# Patient Record
Sex: MALE | Race: WHITE | NOT HISPANIC OR LATINO | URBAN - METROPOLITAN AREA
[De-identification: names, ages, dates, MRNs, and addresses within clinical notes are randomized per-mention and may not be internally consistent; named-entity substitution may affect disease eponyms.]

---

## 2024-01-22 ENCOUNTER — OFFICE VISIT (OUTPATIENT)
Dept: INTERNAL MEDICINE | Facility: CLINIC | Age: 29
End: 2024-01-22
Payer: COMMERCIAL

## 2024-01-22 VITALS
SYSTOLIC BLOOD PRESSURE: 130 MMHG | HEART RATE: 87 BPM | DIASTOLIC BLOOD PRESSURE: 64 MMHG | TEMPERATURE: 99.5 F | OXYGEN SATURATION: 98 %

## 2024-01-22 DIAGNOSIS — J01.01 ACUTE RECURRENT MAXILLARY SINUSITIS: Primary | ICD-10-CM

## 2024-01-22 DIAGNOSIS — H69.93 EUSTACHIAN TUBE DYSFUNCTION, BILATERAL: ICD-10-CM

## 2024-01-22 DIAGNOSIS — R05.2 SUBACUTE COUGH: ICD-10-CM

## 2024-01-22 PROCEDURE — 99213 OFFICE O/P EST LOW 20 MIN: CPT | Performed by: NURSE PRACTITIONER

## 2024-01-22 RX ORDER — ALBUTEROL SULFATE 90 UG/1
2 AEROSOL, METERED RESPIRATORY (INHALATION) EVERY 4 HOURS PRN
Qty: 8 G | Refills: 0 | Status: SHIPPED | OUTPATIENT
Start: 2024-01-22

## 2024-01-22 RX ORDER — LEVOFLOXACIN 500 MG/1
500 TABLET, FILM COATED ORAL DAILY
Qty: 7 TABLET | Refills: 0 | Status: SHIPPED | OUTPATIENT
Start: 2024-01-22 | End: 2024-01-29

## 2024-01-22 NOTE — PROGRESS NOTES
"Chief Complaint  Cough (X 1 month), congestion (Chest x 1 month), and Earache (Taking mucinex, theraflu, stopped afrin nose spray, restarted since can not breathe out went to [facility] on 12/23 was given abx and steroid. Pt states that abx and steroids didn't help. )    Subjective        Jamie Naidu presents to [facility]  History of Present Illness  Here with complaint 1 month history of cough, chest congestion, ear pain    He was seen at [facility] urgent care on December 23, 2023 with same symptoms.  Diagnosed with viral upper respiratory infection, and he was prescribed prednisone 50 mg tapering dose as well as promethazine-DM cough syrup.  He tested negative for SARS COVID. He went to his previous PCP in [place], [place] on 12/30/2023 and was prescribed Augmentin BID x 5 days. No improvement. He is having a dry cough with intermittent production, sinus congestion and ear pain. He is taking OTC Mucinex, Sinex nose spray, Afrin, TheraFlu. He used to take fluticasone, Neti pot in the AM.     He has no history of asthma, non-smoker. No history of allergies. No other remarkable medical history. He is a  and has been grounded until sinus pain/pressure has improved.       Objective   Vital Signs:  /64 (BP Location: Left arm, Patient Position: Sitting, Cuff Size: Adult)   Pulse 87   Temp 99.5 °F (37.5 °C) (Infrared)   SpO2 98%   Estimated body mass index is 29.57 kg/m² as calculated from the following:    Height as of 12/23/23: 182.9 cm (72\").    Weight as of 12/23/23: 98.9 kg (218 lb).       BMI is >= 25 and <30. (Overweight) The following options were offered after discussion;: not discussed at this office visit.       Physical Exam  Vitals reviewed.   Constitutional:       General: He is not in acute distress.     Appearance: Normal appearance. He is normal weight. He is not ill-appearing or toxic-appearing.   HENT:      Head: Normocephalic.      Right Ear: Hearing, ear " canal and external ear normal. A middle ear effusion is present. There is no impacted cerumen. No mastoid tenderness. Tympanic membrane is retracted (mild, not as retracted as left TM, dull). Tympanic membrane is not perforated or erythematous.      Left Ear: Hearing, ear canal and external ear normal. A middle ear effusion is present. There is no impacted cerumen. No mastoid tenderness. Tympanic membrane is retracted. Tympanic membrane is not perforated or erythematous.      Nose: Congestion and rhinorrhea present.      Mouth/Throat:      Mouth: Mucous membranes are dry.      Pharynx: Oropharynx is clear.   Eyes:      General:         Right eye: No discharge.         Left eye: No discharge.      Conjunctiva/sclera: Conjunctivae normal.      Pupils: Pupils are equal, round, and reactive to light.   Cardiovascular:      Rate and Rhythm: Normal rate and regular rhythm.      Pulses: Normal pulses.      Heart sounds: Normal heart sounds.   Pulmonary:      Effort: Pulmonary effort is normal.      Breath sounds: Normal breath sounds.   Lymphadenopathy:      Head:      Right side of head: No submental, submandibular, tonsillar, preauricular, posterior auricular or occipital adenopathy.      Left side of head: No submental, submandibular, tonsillar, preauricular, posterior auricular or occipital adenopathy.   Skin:     General: Skin is warm.      Capillary Refill: Capillary refill takes less than 2 seconds.   Neurological:      General: No focal deficit present.      Mental Status: He is alert and oriented to person, place, and time. Mental status is at baseline.   Psychiatric:         Mood and Affect: Mood normal.         Behavior: Behavior normal.         Thought Content: Thought content normal.         Judgment: Judgment normal.        Result Review :                   Assessment and Plan   Patient is a very pleasant 28-year-old male with no remarkable medical history here for at least 1 month history of  cough.    Diagnoses and all orders for this visit:    1. Acute recurrent maxillary sinusitis (Primary)  -     levoFLOXacin (Levaquin) 500 MG tablet; Take 1 tablet by mouth Daily for 7 days.  Dispense: 7 tablet; Refill: 0    2. Eustachian tube dysfunction, bilateral  Comments:  May use over-the-counter fluticasone (FLONASE) and Allegra daily    3. Subacute cough  -     albuterol sulfate  (90 Base) MCG/ACT inhaler; Inhale 2 puffs Every 4 (Four) Hours As Needed for Shortness of Air.  Dispense: 8 g; Refill: 0  -     Spacer/Aero-Holding Chambers device; Use 1 Units As Needed (use with inhaler).  Dispense: 1 each; Refill: 0    If no improvement, or if symptoms recur, will refer to ENT for further evaluation.          Follow Up   Return if symptoms worsen or fail to improve.  Patient was given instructions and counseling regarding his condition or for health maintenance advice. Please see specific information pulled into the AVS if appropriate.

## 2024-01-24 ENCOUNTER — PATIENT ROUNDING (BHMG ONLY) (OUTPATIENT)
Dept: INTERNAL MEDICINE | Facility: CLINIC | Age: 29
End: 2024-01-24
Payer: COMMERCIAL